# Patient Record
Sex: MALE | Race: WHITE | Employment: FULL TIME | URBAN - METROPOLITAN AREA
[De-identification: names, ages, dates, MRNs, and addresses within clinical notes are randomized per-mention and may not be internally consistent; named-entity substitution may affect disease eponyms.]

---

## 2018-11-21 ENCOUNTER — TRANSFERRED RECORDS (OUTPATIENT)
Dept: HEALTH INFORMATION MANAGEMENT | Facility: CLINIC | Age: 43
End: 2018-11-21

## 2019-06-27 ENCOUNTER — TELEPHONE (OUTPATIENT)
Dept: OTHER | Facility: CLINIC | Age: 44
End: 2019-06-27

## 2019-06-27 NOTE — TELEPHONE ENCOUNTER
Pt referred to [facility] by Dr. Iglesias for colitis.    Pt has previously no showed for appt with Dr. Cox on 12/3/18.    Referring notes available in Care Everywhere.    Pt needs to be scheduled for consult with Dr. Cox (per referring).  Will route to scheduling to coordinate an appointment at next available.    CARMEL العراقي RN

## 2019-08-28 ENCOUNTER — OFFICE VISIT (OUTPATIENT)
Dept: OTHER | Facility: CLINIC | Age: 44
End: 2019-08-28
Attending: INTERNAL MEDICINE
Payer: COMMERCIAL

## 2019-08-28 VITALS
RESPIRATION RATE: 16 BRPM | HEART RATE: 78 BPM | BODY MASS INDEX: 33 KG/M2 | SYSTOLIC BLOOD PRESSURE: 111 MMHG | OXYGEN SATURATION: 98 % | DIASTOLIC BLOOD PRESSURE: 78 MMHG | WEIGHT: 249 LBS | HEIGHT: 73 IN

## 2019-08-28 DIAGNOSIS — Z72.0 TOBACCO ABUSE: ICD-10-CM

## 2019-08-28 DIAGNOSIS — K55.9 MESENTERIC ISCHEMIA (H): Primary | ICD-10-CM

## 2019-08-28 PROCEDURE — 99215 OFFICE O/P EST HI 40 MIN: CPT | Mod: ZP | Performed by: INTERNAL MEDICINE

## 2019-08-28 PROCEDURE — G0463 HOSPITAL OUTPT CLINIC VISIT: HCPCS

## 2019-08-28 RX ORDER — MONTELUKAST SODIUM 10 MG/1
10 TABLET ORAL AT BEDTIME
COMMUNITY

## 2019-08-28 RX ORDER — ALBUTEROL SULFATE 90 UG/1
2 AEROSOL, METERED RESPIRATORY (INHALATION) 3 TIMES DAILY
COMMUNITY

## 2019-08-28 RX ORDER — PSEUDOEPHEDRINE HCL 30 MG
30 TABLET ORAL EVERY 4 HOURS PRN
COMMUNITY

## 2019-08-28 ASSESSMENT — MIFFLIN-ST. JEOR: SCORE: 2068.34

## 2019-08-28 NOTE — PROGRESS NOTES
See letter dictated to referring MD.   Greater than one half the 60 minutes total spent on the pt's visit were spent providing education and counselling to the patient regarding the above matters.

## 2019-08-28 NOTE — PROGRESS NOTES
Visit Date:   2019            Dori Iglesias MD   [address] [address]      Patient:  Orestes Nguyen   MRN #:  [id number]   :  1975      Dear Dr. Iglesias:      Thank you so much for your very kind referral of Mr. Nguyen for initial Vascular Medical assessment at the [facility].  You very kindly referred this individual to us to rule out a vascular etiology for his episodes of recurrent right-sided colitis which have been colonoscopically biopsy-proven to not be consistent with inflammatory bowel disease.  He has seen multiple gastroenterologists and had multiple colonoscopies over the course of the last several years with the same opinion being rendered after colonoscopic biopsies by multiple gastroenterologists; namely, that his colitis is not of inflammatory bowel disease origin.  He is, unfortunately, a 33-pack-year tobacco smoker who has smoked 1 pack per day since the age of 11.  He does not have a family history of premature cardiovascular disease.  He does not have a history of venous or arterial thromboembolic disorders that we know of.  He did, however, have a stroke in the calendar year  at [facility].  Those records are unfortunately unavailable, but it been called for on microfiche.  He was incidentally noted to have a very small ostium secundum atrioseptal defect on transesophageal echocardiography undertaken in .  Again, from a potential cardioembolic etiologic concern, it would be highly unlikely for a patient to have recurrent paradoxical cardioemboli from the heart to the vasculature supplying the ascending colon repetitively through time.        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  He is otherwise feeling quite well.        On full review of systems, he denies chest pain, shortness of breath, nausea,  "vomiting, diarrhea, abdominal pain while episodes of \"colitis\" are not ongoing.  The remainder of his 14-point review of systems is within normal limits.      PAST MEDICAL HISTORY:  Notable for the followin.  Stroke in .   2.  Ostium secundum atrioseptal defect, small, noted in .   3.  Asthma.   4.  Ongoing tobacco abuse.   5.  Urinary retention.   6.  Depression.   7.  Anxiety.   8.  Recurrent colitis, as delineated above.      PREVIOUS SURGICAL/PROCEDURAL HISTORY:   1.  Multiple colonoscopies, as above.   2.  Transesophageal echocardiogram, as above.   3.  Multiple transthoracic echocardiograms failing to reveal previously documented ASD.      FAMILY HISTORY:  Notable for hypothyroidism in his mother, hyperlipidemia in his father.  A sister has a history of pulmonary embolus.  Maternal grandfather has a history of unknown malignancy.  A paternal grandfather had a history of lung cancer and was a heavy smoker.  He had coronary artery disease at a late stage of life.      SOCIAL HISTORY:  The patient is a current smoker with a 33-pack-year tobacco use history.  He is employed as a  for [employer].  He is single.  He has an adult child who is 23 years of age.      PHYSICAL EXAMINATION:   GENERAL:  Currently on physical exam, the patient is awake, alert and oriented.   VITAL SIGNS:  Blood pressure is 111/78 in his left arm, pulse is 78 and regular, respiratory rate is 16, pulse ox 98%.  Height is 6 feet 1 inch tall, weight is 249 pounds.  Body mass index is 32.85.   HEENT:  Oropharynx within normal limits.   NECK:  No JVD, thyromegaly, lymphadenopathy, no carotid bruits.   LUNGS:  Clear to auscultation bilaterally without rales, wheezes or rhonchi.   HEART:  Regular rate and rhythm, normal S1, S2.  No S3, S4, murmur, gallop or rub.   ABDOMEN:  Normoactive bowel sounds, soft, nondistended, nontender.   EXTREMITIES:  Without cyanosis, clubbing or edema.   NEUROLOGIC:  Nonfocal. "   DERMATOLOGIC:  Without suspicious lumps or masses.   HEMATOLOGIC:  Without lymphadenopathy.      IMPRESSION:      Recurrent idiopathic recurrent intermittent idiopathic colitis.      DISCUSSION:      Firstly, thank you again for the kind referral.  The patient is a very pleasant individual.  I encouraged the patient to stop smoking.  We did not have time during the entire course of this discussion to discuss full methods given the fact that he does not want to utilize Nicoderm patches.  I will be seeing the patient back as delineated below and I will reengage him in that discussion at that time regarding potential utilization of Wellbutrin or Chantix.      Regarding potential vascular etiology for the above presentation, I think it is unlikely that the patient, in fact, has one.  Specifically, this would fall into 1 of 2 categories:  He could either have a cardioembolic etiology or an atheroembolic etiology.  To that end, it would be statistically highly unlikely that he would have paradoxical cardioemboli from his very small ostium secundum ASD into the blood supply of the right side of his colon without having other intervening cardioembolic symptoms (i.e., recurrent strokes).  As such, the likely yield in finding an actionable lesion in reperforming a transesophageal echocardiogram is low.  Specifically, the risk of dental fracture, esophageal perforation in this case likely exceeds the likelihood of finding an actionable lesion.  Specifically, the patient additionally wishes to not pursue ASD plugging as he would not wish to be on long-term Plavix.  Nextly, one could consider an atheroembolic focus.  This could be diagnosed with either CT angiogram (looking for more central lesions) or catheter-directed angiography (looking for lesions in the more peripheral mesenteric circulation).  To begin with, the patient will proceed with a CT angiogram of the chest, abdomen and pelvis.  I will obtain this in the near  term future and see the patient back on .  I will inform you of any interval clinical changes after that visit.      Lastly, regarding his atherosclerotic risk factors, I will obtain lab tests to include an NMR LipoProfile, cardiac CRP, and lipoprotein-a.  I will discuss those results with him and yourself at the time of his next visit.        Please do not hesitate to contact me if I may be of assistance regarding this or other patients moving forward into the future.      Sincerely,         NATACHA MCKEON MD             D: 2019   T: 2019   MT: LISE      Name:     LUCIA SHER   MRN:      [id number]        Account:      [id number]   :      1975           Visit Date:   2019      Document: [id number]       cc: Dori Iglesias MD

## 2019-08-28 NOTE — PROGRESS NOTES
"Orestes Nguyen is a 44 year old male who presents for:  Chief Complaint   Patient presents with     RECHECK     Pt referred  by Dr. Iglesias for colitis. (referred to Dr Cox.  Referring notes available in Care Everywhere. *LMB 06/27/19        Vitals:    Vitals:    08/28/19 1118   BP: 111/78   BP Location: Left arm   Patient Position: Chair   Cuff Size: Adult Regular   Pulse: 78   Resp: 16   SpO2: 98%   Weight: 249 lb (112.9 kg)   Height: 6' 1\" (1.854 m)       BMI:  Estimated body mass index is 32.85 kg/m  as calculated from the following:    Height as of this encounter: 6' 1\" (1.854 m).    Weight as of this encounter: 249 lb (112.9 kg).    Pain Score:  Data Unavailable        Aidee To CMA    "

## 2019-09-06 ENCOUNTER — HOSPITAL ENCOUNTER (OUTPATIENT)
Dept: CT IMAGING | Facility: CLINIC | Age: 44
Discharge: HOME | End: 2019-09-06
Attending: INTERNAL MEDICINE | Admitting: INTERNAL MEDICINE
Payer: COMMERCIAL

## 2019-09-06 DIAGNOSIS — K55.9 MESENTERIC ISCHEMIA (H): ICD-10-CM

## 2019-09-06 PROCEDURE — 74174 CTA ABD&PLVS W/CONTRAST: CPT

## 2019-09-06 PROCEDURE — 25000125 ZZHC RX 250: Performed by: INTERNAL MEDICINE

## 2019-09-06 PROCEDURE — 25000128 H RX IP 250 OP 636: Performed by: INTERNAL MEDICINE

## 2019-09-06 RX ORDER — IOPAMIDOL 755 MG/ML
80 INJECTION, SOLUTION INTRAVASCULAR ONCE
Status: COMPLETED | OUTPATIENT
Start: 2019-09-06 | End: 2019-09-06

## 2019-09-06 RX ADMIN — SODIUM CHLORIDE 80 ML: 9 INJECTION, SOLUTION INTRAVENOUS at 12:41

## 2019-09-06 RX ADMIN — IOPAMIDOL 80 ML: 755 INJECTION, SOLUTION INTRAVENOUS at 12:31

## 2019-09-11 ENCOUNTER — TELEPHONE (OUTPATIENT)
Dept: OTHER | Facility: CLINIC | Age: 44
End: 2019-09-11

## 2019-09-11 NOTE — TELEPHONE ENCOUNTER
Called patient to request that his appointment at 12:10 on 9/16 be moved to 2:40.      Patient will attend OV at 2:40 on 9/16.  Kandy Devlin RN BSN

## 2019-09-16 ENCOUNTER — OFFICE VISIT (OUTPATIENT)
Dept: OTHER | Facility: CLINIC | Age: 44
End: 2019-09-16
Attending: INTERNAL MEDICINE
Payer: COMMERCIAL

## 2019-09-16 VITALS
BODY MASS INDEX: 33.13 KG/M2 | HEIGHT: 73 IN | DIASTOLIC BLOOD PRESSURE: 77 MMHG | RESPIRATION RATE: 16 BRPM | OXYGEN SATURATION: 98 % | SYSTOLIC BLOOD PRESSURE: 112 MMHG | WEIGHT: 250 LBS | HEART RATE: 81 BPM

## 2019-09-16 DIAGNOSIS — K52.9 NON-SPECIFIC COLITIS: Primary | ICD-10-CM

## 2019-09-16 DIAGNOSIS — Z72.0 TOBACCO ABUSE: ICD-10-CM

## 2019-09-16 PROCEDURE — 99214 OFFICE O/P EST MOD 30 MIN: CPT | Mod: ZP | Performed by: INTERNAL MEDICINE

## 2019-09-16 PROCEDURE — G0463 HOSPITAL OUTPT CLINIC VISIT: HCPCS

## 2019-09-16 ASSESSMENT — MIFFLIN-ST. JEOR: SCORE: 2072.87

## 2019-09-16 NOTE — PROGRESS NOTES
See letter to referring MD. Greater than one half the twenty five minutes total spent on the pt's visit were spent providing education and counselling to the patient regarding the above matters.       9/6/19 11:54 PM [id number] [facility] CT    PACS Images      Show images for CTA Chest Abdomen Pelvis w Contrast   Study Result     TEMPORARY  9/6/2019 1:42 PM      HISTORY:  Recurrent colitis. Concerns for possible ischemic colitis  related to chronic mesenteric ischemia.     COMPARISON: None.     TECHNIQUE: CT angiogram of the chest, abdomen and pelvis was performed  following the administration of 80 mL contrast. Images are viewed in  multiple planes, and 3-D reconstructions were also performed.  Radiation dose for this scan was reduced using automated exposure  control, adjustment of the mA and/or kV according to patient size, or  iterative reconstruction technique.     FINDINGS:   Vascular examination:  Thoracic aorta: The thoracic aorta is of normal caliber without  aneurysmal dilatation or significant stenosis. The thoracic aortic  takeoff vessels are patent without significant stenoses.     Abdominal aorta: The abdominal aorta is of normal caliber without  aneurysmal dilatation or significant stenosis. The celiac trunk,  superior mesenteric artery, inferior mesenteric artery, and renal  arteries are patent without significant stenoses.     Iliac arteries: The iliac arteries and proximal femoral arteries are  patent without significant stenoses.     Soft tissues:  Chest: There are a few small mediastinal lymph nodes. No pathologic  lymphadenopathy. The lungs are clear.     Abdomen/pelvis: Solid organs in the abdomen appear unremarkable. There  are a few normal-sized mesenteric and retroperitoneal lymph nodes. The  bowel appears grossly unremarkable.                                                                      IMPRESSION: No evidence for mesenteric artery stenosis. No definite  acute  abnormality.     BOBBY ROLLINS MD

## 2019-09-16 NOTE — PROGRESS NOTES
"Orestes Nguyen is a 44 year old male who presents for:  Chief Complaint   Patient presents with     RECHECK     follow up labs & CTA *jw        Vitals:    Vitals:    09/16/19 1428   BP: 112/77   BP Location: Right arm   Patient Position: Chair   Cuff Size: Adult Regular   Pulse: 81   Resp: 16   SpO2: 98%   Weight: 250 lb (113.4 kg)   Height: 6' 1\" (1.854 m)       BMI:  Estimated body mass index is 32.98 kg/m  as calculated from the following:    Height as of this encounter: 6' 1\" (1.854 m).    Weight as of this encounter: 250 lb (113.4 kg).    Pain Score:  Data Unavailable        Aidee To CMA    "

## 2019-09-16 NOTE — PROGRESS NOTES
Visit Date:   2019            Dori Iglesias MD   [address] [address]      Patient:  Orestes Nguyen   MRN:  [id number]   :  1975      Dear Dr. Iglesias:      I saw our mutual patient, Orestes Nguyen, in followup from his initial Vascular Medicine consultation of 2019.  You recall that this is an individual who was referred to me to rule out a vascular etiology for his episodes of recurrent right-sided colitis which have been colonoscopically biopsy-proven to be consistent with inflammatory bowel disease.  The patient has seen multiple gastroenterologists and had multiple colonoscopies over the course of the last several years with the same opinion being rendered after colonoscopic biopsies by multiple gastroenterologists.  He unfortunately continues to smoke.  At the time of my last visit with him, I discussed that we could either perform a catheter-directed angiogram or a CT angiogram.  A catheter-directed angiogram would potentially find peripheral culprit lesions laterally, out laterally or out distally in the mesenteric circulation.  That said, however, I felt that the likelihood of finding an actionable lesion was, in fact, quite low and did not recommend catheter-directed angiography as I felt that the risks outweighed the benefits.  This could certainly be reconsidered at some point in time in the future if the patient continues to still have multiple events.  I did perform a CT angiogram.  This revealed scant, if any, atherosclerotic disease in the patient's abdominal aorta or mesenteric circulation.  As delineated in my last letter, I did not opt to perform a transesophageal echocardiogram on the patient as, although he does have a known ostium secundum ASD, the likelihood of recurrent paradoxical embolization through a small ASD to the right-sided colonic circulation is astronomically unlikely.  The patient does  continue to smoke and I advised that he in fact stop this at the present time.  He actually agrees to utilize nicotine patches on this occasion, whereas previously he had not.  The patient also is supposed to have gotten some laboratory studies drawn (NMR LipoProfile, cardiac CRP, lipoprotein-a).  Unfortunately, he did not get these done.  These will be arranged by my nursing staff today.  If they are abnormal and he requires recurrent followup, we will coordinate getting that done.  If the results are normal, they will be communicated both to the patient and yourself.        If the patient does not have recurrent colitis events and has normal cholesterol, he will not need to see me again in followup.  I did nonetheless encourage him to return to clinic should he have recurrent colitis symptoms.        Please do not hesitate to contact me if I may be of assistance regarding this or other matters moving forward into the future.         NATACHA MCKEON MD             D: 2019   T: 2019   MT: LISE      Name:     LUCIA SHER   MRN:      [id number]        Account:      [id number]   :      1975           Visit Date:   2019      Document: [id number]       cc: Dori Iglesias MD

## 2019-09-20 DIAGNOSIS — K55.9 MESENTERIC ISCHEMIA (H): ICD-10-CM

## 2019-09-20 LAB
ANION GAP SERPL CALCULATED.3IONS-SCNC: 3 MMOL/L (ref 3–14)
BUN SERPL-MCNC: 11 MG/DL (ref 7–30)
CALCIUM SERPL-MCNC: 9.1 MG/DL (ref 8.5–10.1)
CHLORIDE SERPL-SCNC: 111 MMOL/L (ref 94–109)
CO2 SERPL-SCNC: 28 MMOL/L (ref 20–32)
CREAT SERPL-MCNC: 1 MG/DL (ref 0.66–1.25)
CRP SERPL HS-MCNC: 1.9 MG/L
GFR SERPL CREATININE-BSD FRML MDRD: >90 ML/MIN/{1.73_M2}
GLUCOSE SERPL-MCNC: 85 MG/DL (ref 70–99)
HBA1C MFR BLD: 5.1 % (ref 0–5.6)
POTASSIUM SERPL-SCNC: 4.1 MMOL/L (ref 3.4–5.3)
SODIUM SERPL-SCNC: 142 MMOL/L (ref 133–144)

## 2019-09-20 PROCEDURE — 83704 LIPOPROTEIN BLD QUAN PART: CPT | Mod: 90 | Performed by: INTERNAL MEDICINE

## 2019-09-20 PROCEDURE — 36415 COLL VENOUS BLD VENIPUNCTURE: CPT | Performed by: INTERNAL MEDICINE

## 2019-09-20 PROCEDURE — 83036 HEMOGLOBIN GLYCOSYLATED A1C: CPT | Performed by: INTERNAL MEDICINE

## 2019-09-20 PROCEDURE — 86141 C-REACTIVE PROTEIN HS: CPT | Performed by: INTERNAL MEDICINE

## 2019-09-20 PROCEDURE — 99000 SPECIMEN HANDLING OFFICE-LAB: CPT | Performed by: INTERNAL MEDICINE

## 2019-09-20 PROCEDURE — 80048 BASIC METABOLIC PNL TOTAL CA: CPT | Performed by: INTERNAL MEDICINE

## 2019-09-20 PROCEDURE — 80061 LIPID PANEL: CPT | Mod: 59 | Performed by: INTERNAL MEDICINE

## 2019-09-20 PROCEDURE — 83695 ASSAY OF LIPOPROTEIN(A): CPT | Performed by: INTERNAL MEDICINE

## 2019-09-20 PROCEDURE — 83695 ASSAY OF LIPOPROTEIN(A): CPT | Mod: 90 | Performed by: INTERNAL MEDICINE

## 2019-09-21 LAB — LPA SERPL-MCNC: 43 MG/DL

## 2019-09-22 LAB
CHOLEST SERPL-MCNC: 218 MG/DL
HDL SERPL QN: 8.3 NM
HDL SERPL-SCNC: 32.8 UMOL/L
HDLC SERPL-MCNC: 40 MG/DL (ref 40–59)
HLD.LARGE SERPL-SCNC: <2.8 UMOL/L
LDL SERPL QN: 20.9 NM
LDL SERPL-SCNC: 1723 NMOL/L
LDL SMALL SERPL-SCNC: 764 NMOL/L
LDLC SERPL CALC-MCNC: 152 MG/DL
PATHOLOGY STUDY: ABNORMAL
TRIGL SERPL-MCNC: 129 MG/DL (ref 30–149)
VLDL LARGE SERPL-SCNC: 5.2 NMOL/L
VLDL SERPL QN: 50.2 NM

## 2019-09-24 ENCOUNTER — TELEPHONE (OUTPATIENT)
Dept: OTHER | Facility: CLINIC | Age: 44
End: 2019-09-24

## 2019-09-24 NOTE — TELEPHONE ENCOUNTER
----- Message from Matthew Cox MD sent at 9/23/2019  9:31 AM CDT -----  Please read my last oiffice note. Please arrange f/u of abnormal lipids with me at a next availabel green spot in the schedule.

## 2019-09-24 NOTE — TELEPHONE ENCOUNTER
Left voice message for patient to make appointment to review abnormal lipid results in next green open slot per Dr. Cox.    Routing to scheduling.    Kandy Devlin RN BSN  [facility]

## 2019-12-03 ENCOUNTER — OFFICE VISIT (OUTPATIENT)
Dept: OTHER | Facility: CLINIC | Age: 44
End: 2019-12-03
Attending: INTERNAL MEDICINE
Payer: COMMERCIAL

## 2019-12-03 VITALS
HEIGHT: 73 IN | WEIGHT: 253 LBS | HEART RATE: 65 BPM | DIASTOLIC BLOOD PRESSURE: 79 MMHG | BODY MASS INDEX: 33.53 KG/M2 | OXYGEN SATURATION: 97 % | SYSTOLIC BLOOD PRESSURE: 120 MMHG | RESPIRATION RATE: 14 BRPM

## 2019-12-03 DIAGNOSIS — Z72.0 TOBACCO ABUSE: Primary | ICD-10-CM

## 2019-12-03 DIAGNOSIS — E78.5 HYPERLIPIDEMIA LDL GOAL <70: ICD-10-CM

## 2019-12-03 PROCEDURE — 99214 OFFICE O/P EST MOD 30 MIN: CPT | Mod: ZP | Performed by: INTERNAL MEDICINE

## 2019-12-03 PROCEDURE — G0463 HOSPITAL OUTPT CLINIC VISIT: HCPCS

## 2019-12-03 RX ORDER — VARENICLINE TARTRATE 1 MG/1
1 TABLET, FILM COATED ORAL 2 TIMES DAILY
Qty: 60 TABLET | Refills: 1 | Status: SHIPPED | OUTPATIENT
Start: 2019-12-03 | End: 2020-01-06

## 2019-12-03 RX ORDER — ATORVASTATIN CALCIUM 40 MG/1
40 TABLET, FILM COATED ORAL DAILY
Qty: 90 TABLET | Refills: 3 | Status: SHIPPED | OUTPATIENT
Start: 2019-12-03

## 2019-12-03 ASSESSMENT — MIFFLIN-ST. JEOR: SCORE: 2086.48

## 2019-12-03 NOTE — PROGRESS NOTES
"Orestes Nguyen is a 44 year old male who presents for:  Chief Complaint   Patient presents with     Nurse Visit     follow up labs *jmw; f/u of abnormal lipids *eb        Vitals:    Vitals:    12/03/19 1040   BP: 120/79   BP Location: Right arm   Patient Position: Chair   Cuff Size: Adult Regular   Pulse: 65   Resp: 14   SpO2: 97%   Weight: 253 lb (114.8 kg)   Height: 6' 1\" (1.854 m)       BMI:  Estimated body mass index is 33.38 kg/m  as calculated from the following:    Height as of this encounter: 6' 1\" (1.854 m).    Weight as of this encounter: 253 lb (114.8 kg).    Pain Score:  Data Unavailable        Aidee To CMA    "

## 2019-12-03 NOTE — PROGRESS NOTES
Orestes Nguyen is a 44 year old male who is presenting at the current time to discuss his diagnosi(es) of        Tobacco abuse  Hyperlipidemia LDL goal <70 .    HPI:    This is an individual who was referred to me to rule out a vascular etiology for his episodes of recurrent right-sided colitis which have been colonoscopically biopsy-proven to be consistent with inflammatory bowel disease.  The patient has seen multiple gastroenterologists and had multiple colonoscopies over the course of the last several years with the same opinion being rendered after colonoscopic biopsies by multiple gastroenterologists.  He unfortunately continues to smoke.  At the time of my last visit with him, I discussed that we could either perform a catheter-directed angiogram or a CT angiogram.  A catheter-directed angiogram would potentially find peripheral culprit lesions laterally, out laterally or out distally in the mesenteric circulation.  That said, however, I felt that the likelihood of finding an actionable lesion was, in fact, quite low and did not recommend catheter-directed angiography as I felt that the risks outweighed the benefits.  This could certainly be reconsidered at some point in time in the future if the patient continues to still have multiple events.  I did perform a CT angiogram.  This revealed scant, if any, atherosclerotic disease in the patient's abdominal aorta or mesenteric circulation.  As delineated in my last letter, I did not opt to perform a transesophageal echocardiogram on the patient as, although he does have a known ostium secundum ASD, the likelihood of recurrent paradoxical embolization through a small ASD to the right-sided colonic circulation is astronomically unlikely.    Review Of Systems  Skin: negative  Eyes: negative  Ears/Nose/Throat: negative  Respiratory: No shortness of breath, dyspnea on exertion, cough, or hemoptysis  Cardiovascular: negative  Gastrointestinal: negative  Genitourinary:  "negative  Musculoskeletal: negative  Neurologic: negative  Psychiatric: negative  Hematologic/Lymphatic/Immunologic: negative  Endocrine: negative     PAST MEDICAL HISTORY:                  Past Medical History:   Diagnosis Date     Depressive disorder, not elsewhere classified     Depression (non-psychotic)     Migraine, unspecified, without mention of intractable migraine without mention of status migrainosus      Ostium secundum type atrial septal defect      Personal history of other disorder of urinary system      Unspecified late effects of cerebrovascular disease        PAST SURGICAL HISTORY:                No past surgical history on file.    CURRENT MEDICATIONS:                  Current Outpatient Medications   Medication Sig Dispense Refill     ALPRAZolam (XANAX) 0.25 MG tablet Take 0.25 mg by mouth as needed.       bisacodyl (DULCOLAX) 5 MG EC tablet Take 3 tablets by mouth. Refer to \"Getting Ready for a Colonoscopy\" instruction handout 3 tablet 0     montelukast (SINGULAIR) 10 MG tablet Take 10 mg by mouth At Bedtime       polyethylene glycol (GOLYTELY,NULYTELY) 236 GM suspension Take 4,000 mLs by mouth. Refer to \"Getting Ready for a Colonoscopy\" instruction handout 4000 mL 0     pseudoePHEDrine (SUDAFED) 30 MG tablet Take 30 mg by mouth every 4 hours as needed for congestion        tamsulosin (FLOMAX) 0.4 MG 24 hr capsule Take 1 capsule by mouth daily. 90 capsule 3     albuterol (PROAIR HFA/PROVENTIL HFA/VENTOLIN HFA) 108 (90 Base) MCG/ACT inhaler Inhale 2 puffs into the lungs 3 times daily         ALLERGIES:                  Allergies   Allergen Reactions     Codeine Nausea and Vomiting     No Known Drug Allergies        SOCIAL HISTORY:                  Social History     Socioeconomic History     Marital status: Single     Spouse name: Not on file     Number of children: 1     Years of education: Not on file     Highest education level: Not on file   Occupational History     Occupation: Finance     " Employer: [employer]   Social Needs     Financial resource strain: Not on file     Food insecurity:     Worry: Not on file     Inability: Not on file     Transportation needs:     Medical: Not on file     Non-medical: Not on file   Tobacco Use     Smoking status: Current Every Day Smoker     Packs/day: 1.00     Years: 33.00     Pack years: 33.00     Types: Cigarettes     Smokeless tobacco: Current User   Substance and Sexual Activity     Alcohol use: Yes     Comment: 2 drinks per week     Drug use: Not on file     Sexual activity: Not Currently   Lifestyle     Physical activity:     Days per week: Not on file     Minutes per session: Not on file     Stress: Not on file   Relationships     Social connections:     Talks on phone: Not on file     Gets together: Not on file     Attends Quaker service: Not on file     Active member of club or organization: Not on file     Attends meetings of clubs or organizations: Not on file     Relationship status: Not on file     Intimate partner violence:     Fear of current or ex partner: Not on file     Emotionally abused: Not on file     Physically abused: Not on file     Forced sexual activity: Not on file   Other Topics Concern     Parent/sibling w/ CABG, MI or angioplasty before 65F 55M? Not Asked   Social History Narrative     Not on file       FAMILY HISTORY:                   Family History   Problem Relation Age of Onset     Thyroid Disease Mother      Lipids Father      Respiratory Sister         blood clots in lungs     Heart Disease Paternal Grandfather         heart attack     Cancer Paternal Grandfather         lung cancer-heavy smoker     Cancer Maternal Grandfather          Physical exam Reveals:    O/P: WNL  HEENT: WNL  NECK: No JVD, thyromegaly, or lymphadenopathy  HEART: RRR, no murmurs, gallops, or rubs  LUNGS: CTA bilaterally without rales, wheezes, or rhonchi  GI: NABS, nondistended, nontender, soft  EXT:without cyanosis, clubbing, or edema  NEURO:  nonfocal  : no flank tenderness      Component      Latest Ref Rng & Units 9/20/2019   Total Cholesterol      <=199 mg/dL 218 (H)   Triglycerides      30 - 149 mg/dL 129   HDL Cholesterol      40 - 59 mg/dL 40   LDL Cholesterol      <=129 mg/dL 152 (H)   HDL Size      >=8.9 nm 8.3 (L)   VLDL Size      <=46.7 nm 50.2 (H)   LDL Particle Size      >=20.7 nm 20.9   Lge HDL Particle number      >=4.2 umol/L <2.8 (L)   HDL Particle Number NMR      >=33.0 umol/L 32.8 (L)   Lge VLDL Part number      <=2.7 nmol/L 5.2 (H)   Small LDL Particle number      <=634 nmol/L 764 (H)   LDL Particle Number      <=1,135 nmol/L 1,723 (H)   EER LipoFit by NMR       SEE NOTE   Sodium      133 - 144 mmol/L 142   Potassium      3.4 - 5.3 mmol/L 4.1   Chloride      94 - 109 mmol/L 111 (H)   Carbon Dioxide      20 - 32 mmol/L 28   Anion Gap      3 - 14 mmol/L 3   Glucose      70 - 99 mg/dL 85   Urea Nitrogen      7 - 30 mg/dL 11   Creatinine      0.66 - 1.25 mg/dL 1.00   GFR Estimate      >60 mL/min/1.73:m2 >90   GFR Estimate If Black      >60 mL/min/1.73:m2 >90   Calcium      8.5 - 10.1 mg/dL 9.1   Hemoglobin A1C      0 - 5.6 % 5.1   CRP Cardiac Risk      mg/L 1.9   Lipoprotein (a)      <=29 mg/dL 43 (H)       A/P:    (Z72.0) Tobacco abuse  (primary encounter diagnosis)  Comment: stop smoking  Plan: varenicline (CHANTIX DAVIDSON) 0.5 MG X 11 & 1 MG X         42 tablet, varenicline (CHANTIX) 1 MG tablet            (E78.5) Hyperlipidemia LDL goal <70  Comment: not at goal, start statin, repeat labs in three months  Plan: LipoFit by NMR, CRP cardiac risk, Lipoprotein         (a), atorvastatin (LIPITOR) 40 MG tablet             Greater than one half the twenty five minutes total spent on the pt's visit were spent providing education and counselling to the patient regarding the above matters.

## 2020-01-06 DIAGNOSIS — Z72.0 TOBACCO ABUSE: ICD-10-CM

## 2020-01-06 RX ORDER — VARENICLINE TARTRATE 1 MG/1
1 TABLET, FILM COATED ORAL 2 TIMES DAILY
Qty: 60 TABLET | Refills: 1 | Status: SHIPPED | OUTPATIENT
Start: 2020-01-06

## 2020-01-06 NOTE — TELEPHONE ENCOUNTER
"varenicline (CHANTIX) 1 MG tablet  Last Written Prescription Date:  12/3/19  Last Fill Quantity: 60,  # refills: 1   Last office visit: 12/3/19    Requested Prescriptions   Pending Prescriptions Disp Refills     varenicline (CHANTIX) 1 MG tablet 60 tablet 1     Sig: Take 1 tablet (1 mg) by mouth 2 times daily       Partial Cholinergic Nicotinic Agonist Agents Passed - 1/6/2020  2:15 PM        Passed - Blood pressure under 140/90 in past 12 months     BP Readings from Last 3 Encounters:   12/03/19 120/79   09/16/19 112/77   08/28/19 111/78                 Passed - Recent (12 mo) or future (30 days) visit within the authorizing provider's specialty     Patient has had an office visit with the authorizing provider or a provider within the authorizing providers department within the previous 12 mos or has a future within next 30 days. See \"Patient Info\" tab in inbasket, or \"Choose Columns\" in Meds & Orders section of the refill encounter.              Passed - Medication is active on med list        Passed - Patient is 18 years of age or older        Prescription approved per [facility] Refill Protocol.  Kandy Devlin RN BSN  [facility]          "

## 2020-01-21 DIAGNOSIS — Z72.0 TOBACCO ABUSE: ICD-10-CM

## 2020-01-21 RX ORDER — VARENICLINE TARTRATE 1 MG/1
1 TABLET, FILM COATED ORAL 2 TIMES DAILY
Qty: 168 TABLET | Refills: 0 | Status: SHIPPED | OUTPATIENT
Start: 2020-01-21

## 2020-01-21 NOTE — TELEPHONE ENCOUNTER
Request for 90 day Rx of Chantix.    [place] [facility] [place]  Kandy Devlin RN BSN  [facility]

## 2020-02-10 ENCOUNTER — HEALTH MAINTENANCE LETTER (OUTPATIENT)
Age: 45
End: 2020-02-10

## 2020-06-29 ENCOUNTER — TELEPHONE (OUTPATIENT)
Dept: OTHER | Facility: CLINIC | Age: 45
End: 2020-06-29

## 2020-06-29 NOTE — TELEPHONE ENCOUNTER
Patient would like lab orders faxed to [facility].    [facility] Fax: [phone].    Fax confirmed complete 6/30/20 09:47    Kandy Devlin RN BSN  [facility]  [phone]

## 2020-07-09 ENCOUNTER — TRANSFERRED RECORDS (OUTPATIENT)
Dept: HEALTH INFORMATION MANAGEMENT | Facility: CLINIC | Age: 45
End: 2020-07-09

## 2020-07-16 ENCOUNTER — VIRTUAL VISIT (OUTPATIENT)
Dept: OTHER | Facility: CLINIC | Age: 45
End: 2020-07-16
Attending: INTERNAL MEDICINE
Payer: COMMERCIAL

## 2020-07-16 VITALS — BODY MASS INDEX: 31.54 KG/M2 | HEIGHT: 73 IN | WEIGHT: 238 LBS

## 2020-07-16 DIAGNOSIS — E78.5 HYPERLIPIDEMIA LDL GOAL <70: ICD-10-CM

## 2020-07-16 DIAGNOSIS — Z72.0 TOBACCO ABUSE: ICD-10-CM

## 2020-07-16 PROCEDURE — 99214 OFFICE O/P EST MOD 30 MIN: CPT | Mod: ZP | Performed by: INTERNAL MEDICINE

## 2020-07-16 ASSESSMENT — MIFFLIN-ST. JEOR: SCORE: 2013.44

## 2020-07-16 NOTE — PROGRESS NOTES
Orestes Nguyen is a 44 year old male who is presenting at the current time to discuss his diagnosi(es) of         Tobacco abuse  Hyperlipidemia LDL goal <70 .     HPI:     This is an individual who was referred to me to rule out a vascular etiology for his episodes of recurrent right-sided colitis which have been colonoscopically biopsy-proven to be consistent with inflammatory bowel disease.  The patient has seen multiple gastroenterologists and had multiple colonoscopies over the course of the last several years with the same opinion being rendered after colonoscopic biopsies by multiple gastroenterologists.  He unfortunately continues to smoke.  At the time of my last visit with him, I discussed that we could either perform a catheter-directed angiogram or a CT angiogram.  A catheter-directed angiogram would potentially find peripheral culprit lesions laterally, out laterally or out distally in the mesenteric circulation.  That said, however, I felt that the likelihood of finding an actionable lesion was, in fact, quite low and did not recommend catheter-directed angiography as I felt that the risks outweighed the benefits.  This could certainly be reconsidered at some point in time in the future if the patient continues to still have multiple events.  I did perform a CT angiogram.  This revealed scant, if any, atherosclerotic disease in the patient's abdominal aorta or mesenteric circulation.  As delineated in my last letter, I did not opt to perform a transesophageal echocardiogram on the patient as, although he does have a known ostium secundum ASD, the likelihood of recurrent paradoxical embolization through a small ASD to the right-sided colonic circulation is astronomically unlikely.    He recently rechecked labs not in the [facility] system and NMR was drawn incorrectly without a lipid panel. That said his LDL particle number is quite high still, and Lp(a), although improved, is still elevated. He confirms to me  "he is not taking his Lipitor.     Review Of Systems  Skin: negative  Eyes: negative  Ears/Nose/Throat: negative  Respiratory: No shortness of breath, dyspnea on exertion, cough, or hemoptysis  Cardiovascular: negative  Gastrointestinal: negative  Genitourinary: negative  Musculoskeletal: negative  Neurologic: negative  Psychiatric: negative  Hematologic/Lymphatic/Immunologic: negative  Endocrine: negative     PAST MEDICAL HISTORY:                  Past Medical History:   Diagnosis Date     Depressive disorder, not elsewhere classified     Depression (non-psychotic)     Migraine, unspecified, without mention of intractable migraine without mention of status migrainosus      Ostium secundum type atrial septal defect      Personal history of other disorder of urinary system      Unspecified late effects of cerebrovascular disease        PAST SURGICAL HISTORY:                No past surgical history on file.    CURRENT MEDICATIONS:                  Current Outpatient Medications   Medication Sig Dispense Refill     albuterol (PROAIR HFA/PROVENTIL HFA/VENTOLIN HFA) 108 (90 Base) MCG/ACT inhaler Inhale 2 puffs into the lungs 3 times daily       ALPRAZolam (XANAX) 0.25 MG tablet Take 0.25 mg by mouth as needed.       atorvastatin (LIPITOR) 40 MG tablet Take 1 tablet (40 mg) by mouth daily 90 tablet 3     bisacodyl (DULCOLAX) 5 MG EC tablet Take 3 tablets by mouth. Refer to \"Getting Ready for a Colonoscopy\" instruction handout 3 tablet 0     montelukast (SINGULAIR) 10 MG tablet Take 10 mg by mouth At Bedtime       polyethylene glycol (GOLYTELY,NULYTELY) 236 GM suspension Take 4,000 mLs by mouth. Refer to \"Getting Ready for a Colonoscopy\" instruction handout 4000 mL 0     pseudoePHEDrine (SUDAFED) 30 MG tablet Take 30 mg by mouth every 4 hours as needed for congestion        tamsulosin (FLOMAX) 0.4 MG 24 hr capsule Take 1 capsule by mouth daily. 90 capsule 3     varenicline (CHANTIX DAVIDSON) 0.5 MG X 11 & 1 MG X 42 tablet Take " 0.5 mg tab daily for 3 days, THEN 0.5 mg tab twice daily for 4 days, THEN 1 mg twice daily. 53 tablet 0     varenicline (CHANTIX) 1 MG tablet Take 1 tablet (1 mg) by mouth 2 times daily 168 tablet 0     varenicline (CHANTIX) 1 MG tablet Take 1 tablet (1 mg) by mouth 2 times daily 60 tablet 1       ALLERGIES:                  Allergies   Allergen Reactions     Codeine Nausea and Vomiting     No Known Drug Allergies        SOCIAL HISTORY:                  Social History     Socioeconomic History     Marital status: Single     Spouse name: Not on file     Number of children: 1     Years of education: Not on file     Highest education level: Not on file   Occupational History     Occupation: Finance     Employer: [employer]   Social Needs     Financial resource strain: Not on file     Food insecurity     Worry: Not on file     Inability: Not on file     Transportation needs     Medical: Not on file     Non-medical: Not on file   Tobacco Use     Smoking status: Current Every Day Smoker     Packs/day: 1.00     Years: 33.00     Pack years: 33.00     Types: Cigarettes     Smokeless tobacco: Current User   Substance and Sexual Activity     Alcohol use: Yes     Comment: 2 drinks per week     Drug use: Not on file     Sexual activity: Not Currently   Lifestyle     Physical activity     Days per week: Not on file     Minutes per session: Not on file     Stress: Not on file   Relationships     Social connections     Talks on phone: Not on file     Gets together: Not on file     Attends Hoahaoism service: Not on file     Active member of club or organization: Not on file     Attends meetings of clubs or organizations: Not on file     Relationship status: Not on file     Intimate partner violence     Fear of current or ex partner: Not on file     Emotionally abused: Not on file     Physically abused: Not on file     Forced sexual activity: Not on file   Other Topics Concern     Parent/sibling w/ CABG, MI or angioplasty  before 65F 55M? Not Asked   Social History Narrative     Not on file       FAMILY HISTORY:                   Family History   Problem Relation Age of Onset     Thyroid Disease Mother      Lipids Father      Respiratory Sister         blood clots in lungs     Heart Disease Paternal Grandfather         heart attack     Cancer Paternal Grandfather         lung cancer-heavy smoker     Cancer Maternal Grandfather          Physical exam was not performed as this  is a COVID mandated video visit.      Component      Latest Ref Rng & Units 9/20/2019   Total Cholesterol      <=199 mg/dL 218 (H)   Triglycerides      30 - 149 mg/dL 129   HDL Cholesterol      40 - 59 mg/dL 40   LDL Cholesterol      <=129 mg/dL 152 (H)   HDL Size      >=8.9 nm 8.3 (L)   VLDL Size      <=46.7 nm 50.2 (H)   LDL Particle Size      >=20.7 nm 20.9   Lge HDL Particle number      >=4.2 umol/L <2.8 (L)   HDL Particle Number NMR      >=33.0 umol/L 32.8 (L)   Lge VLDL Part number      <=2.7 nmol/L 5.2 (H)   Small LDL Particle number      <=634 nmol/L 764 (H)   LDL Particle Number      <=1,135 nmol/L 1,723 (H)   EER LipoFit by NMR       SEE NOTE   Hemoglobin A1C      0 - 5.6 % 5.1   CRP Cardiac Risk      mg/L 1.9   Lipoprotein (a)      <=29 mg/dL 43 (H)        Ref Range & Units  7d ago    LDL P  <1,000 nmol/L  1,730High       Comment:                           Low                   < 1000                             Moderate         1000 - 1299                             Borderline-High  1300 - 1599                             High             1600 - 2000                             Very High             > 2000    HDL P Total  >=30.5 umol/L  31.5     Small LDL P  <=527 nmol/L  743High       LDL Size  >20.5 nm  20.9     Comment:  ----------------------------------------------------------                    ** INTERPRETATIVE INFORMATION**                    PARTICLE CONCENTRATION AND SIZE                       <--Lower CVD Risk   Higher CVD Risk-->    LDL  AND HDL PARTICLES   Percentile in Reference Population     HDL-P (total)        High     75th    50th    25th   Low                          >34.9    34.9    30.5    26.7   <26.7     Small LDL-P          Low      25th    50th    75th   High                          <117     117     527     839    >839     LDL Size   <-Large (Pattern A)->    <-Small (Pattern B)->                      23.0    20.6           20.5      19.0    ----------------------------------------------------------   Small LDL-P and LDL Size are associated with CVD risk, but not after   LDL-P is taken into account.    Large VLDL P  <=2.7 nmol/L  6.2High       Small LDL P  <=527 nmol/L  743High       Large HDL P  >=4.8 umol/L  4.1Low       VLDL Size  <=46.6 nm  53.9High       LDL Size  >=20.8 nm  20.9     HDL Size  >=9.2 nm  8.7Low       LP IR Score  <=45  74High       Comment:  ----------------------------------------------------------                INSULIN RESISTANCE / DIABETES RISK MARKERS              <--Insulin Sensitive      Insulin Resistant-->                        Percentile in Reference Population     Large VLDL-P      Low     25th     50th     75th     High                       <0.9    0.9      2.7      6.9      >6.9     Small LDL-P       Low     25th     50th     75th     High                       <117    117      527      839      >839     Large HDL-P       High    75th     50th     25th     Low                       >7.3    7.3      4.8      3.1      <3.1     VLDL Size         Small   25th     50th     75th     Large                       <42.4   42.4     46.6     52.5     >52.5     LDL Size          Large   75th     50th     25th     Small                       >21.2   21.2     20.8     20.4     <20.4     HDL Size          Large   75th     50th     25th     Small                       >9.6    9.6      9.2      8.9      <8.9     Insulin Resistance Score     LP-IR SCORE       Low     25th     50th     75th     High                        <27     27       45       63       >63           LIPOPROTEIN (A)  <30.00 mg/dL  34.00High            A/P:         (Z72.0) Tobacco abuse  (primary encounter diagnosis)  Comment: He has stopped smoking and was congratulated for this.               (E78.5) Hyperlipidemia LDL goal <70  Comment: not at goal, repeat labs in next month fasting at [facility]  Plan: LipoFit by NMR, CRP cardiac risk, Lipoprotein         (a), start  atorvastatin (LIPITOR) 40 MG tablet if not better              Video start:  13:42  Video end:  14:09

## 2020-07-16 NOTE — PROGRESS NOTES
"Orestes Nguyen is a 45 year old male who is being evaluated via a billable video visit.      The patient has been notified of following:     \"This video visit will be conducted via a call between you and your physician/provider. We have found that certain health care needs can be provided without the need for an in-person physical exam.  This service lets us provide the care you need with a video conversation.  If a prescription is necessary we can send it directly to your pharmacy.  If lab work is needed we can place an order for that and you can then stop by our lab to have the test done at a later time.    Video visits are billed at different rates depending on your insurance coverage.  Please reach out to your insurance provider with any questions.    If during the course of the call the physician/provider feels a video visit is not appropriate, you will not be charged for this service.\"    Patient has given verbal consent for Video visit? Yes, mobile phone number   [phone]  How would you like to obtain your AVS? Mailed     Will anyone else be joining your video visit? No      "

## 2020-07-17 ENCOUNTER — TELEPHONE (OUTPATIENT)
Dept: OTHER | Facility: CLINIC | Age: 45
End: 2020-07-17

## 2020-07-17 NOTE — TELEPHONE ENCOUNTER
Please arrange for fasting labs prior to 7/31/20 in [place].    Virtual office visit 8/12/20 @ 3:10 with Dr. Cox (time has been held)    Kandy Devlin RN BSN  [facility]  [phone]

## 2020-07-20 NOTE — TELEPHONE ENCOUNTER
July 20, 2020    Spoke with patient and instructed him to call [facility] and get scheduled for fasting labs prior to 7/31/2020 ( is [facility] and [facility] is not permitted to schedule). Patient was provided scheduling number for [facility].     Patient is also scheduled for RTN VIDEO VISIT on 8/12/2020 with Dr. Cox.     Adela Beavers    [facility]  Office: [phone]  Fax [phone]

## 2020-11-16 ENCOUNTER — HEALTH MAINTENANCE LETTER (OUTPATIENT)
Age: 45
End: 2020-11-16

## 2021-04-03 ENCOUNTER — HEALTH MAINTENANCE LETTER (OUTPATIENT)
Age: 46
End: 2021-04-03

## 2021-05-26 ENCOUNTER — RECORDS - HEALTHEAST (OUTPATIENT)
Dept: ADMINISTRATIVE | Facility: CLINIC | Age: 46
End: 2021-05-26

## 2021-09-18 ENCOUNTER — HEALTH MAINTENANCE LETTER (OUTPATIENT)
Age: 46
End: 2021-09-18

## 2022-04-30 ENCOUNTER — HEALTH MAINTENANCE LETTER (OUTPATIENT)
Age: 47
End: 2022-04-30

## 2022-11-20 ENCOUNTER — HEALTH MAINTENANCE LETTER (OUTPATIENT)
Age: 47
End: 2022-11-20

## 2023-06-01 ENCOUNTER — HEALTH MAINTENANCE LETTER (OUTPATIENT)
Age: 48
End: 2023-06-01